# Patient Record
(demographics unavailable — no encounter records)

---

## 2017-01-01 NOTE — HP
Information from Mother's Record: 





 Previous Pregnancy/Births











Maternal Age                   35


 


Grav                           4


 


Para                           2


 


SAB                            1


 


IEA                            0


 


LC                             2


 


Maternal Blood Type and Rh     A Positive








 Testing Needs/Results











Gestational Age in Weeks and   40 Weeks and 1 Days





Days                           


 


Determined By                  LMP


 


Violence or Abuse During this  No





Pregnancy                      


 


Feeding Plan                   Breast,Formula


 


Planned Infant Care Provider   [facility]





Post-Discharge                 


 


Serology/RPR Result            Non-Reactive


 


Rubella Result                 Immune


 


HBsAg Result                   Negative


 


HIV Result                     Negative


 


GBS Culture Result             Positive











 Significant Medical History











Hx  Section            No








 Tobacco/Alcohol/Substance Use











Smoking Status (MU)            Never Smoked Tobacco


 


Alcohol Use                    None


 


Substance Use Type             None,Prescribed








 Delivery Information/Events of Note











Date of Birth [A]              04/10/17


 


Time of Birth [A]              10:57


 


Delivery Method [A]            Spontaneous Vaginal


 


Labor [A]                      Spontaneous


 


Did Patient attempt ? [A]  N/A, No Previous C-Sectio


 


Amniotic Fluid [A]             Clear


 


Anesthesia/Analgesia [A]       CEI for Labor


 


Level of Nursery               Regular/Bedside


 


Delivery Events of Note        Pitocin During Labor

















Delivery Events


Date of Birth: 04/10/17


Time of Birth: 10:57


Apgar Score 1 Minute: 9


Apgar Score 5 Minutes: 9


Gestational Age Weeks: 40


Gestational Age Days: 1


Delivery Type: Vaginal


Amniotic Fluid: Clear


Intrapartal Antibiotics Indicated: Urine GBS Positive


Antibiotic Treatment: Optimal Antibx given, >4hrs


Any S/S Sepsis Present in [place]: No


ROM Greater Than or Equal To 18 Hours: No


Chorioamnionitis or Fever of 100.4 or >: No


Hepatitis B Vaccine: Given Within 12 Hours


Immunoglobulin Given: No


 Drug Withdrawal Risk: None Apply


Hepatitis B Status/Risk: Mother HBsAg NEGATIVE With No New Risk Factors


Maternal Consent: Mother CONSENTS To Infant Hepatitis Vaccine +/- HBIG





Hypoglycemia Assessment


Hypoglycemia Risk - High: None


Hypoglycemia - Other Risk Factors: None


Hypoglycemia Symptoms: None


Chemstrip Protocol: N/A





Nutrition and Output





- Nutrition


Method of Feeding: Breast feeding


Feeding Frequency: Ad Faby





- Stool


Stool Passed: Yes





- Voiding


Voiding: Yes





Measurements


Current Weight: 7 lb 4.122 oz


Weight in lbs and ozs: 7 lbs and 4 oz


Weight Yesterday: 7 lb 9.025 oz


Weight Gain/Loss Since Last Weight In Grams: 139.0 Loss


Birth Weight: 7 lb 9.025 oz


Birthweight in lbs and ozs: 7 lbs and 9 oz


% Weight Gain/Loss from Birth Weight: 4% Loss


Length: 19.5 in


Head Circumference in inches: 14.2





Vitals


Vital Signs: 


 Vital Signs











  04/10/17 04/10/17 04/10/17





  11:36 11:52 12:00


 


Temperature 98.0 F 98.4 F 97.4 F


 


Pulse Rate 145 128 155


 


Respiratory 52 34 65





Rate   














  04/10/17 04/10/17 04/10/17





  13:05 14:28 16:37


 


Temperature 98.0 F 97.5 F 98.4 F


 


Pulse Rate 132 130 118


 


Respiratory 40 45 50





Rate   














  04/10/17 04/11/17 04/11/17





  20:13 00:43 05:13


 


Temperature 98.0 F 98.1 F 98 F


 


Pulse Rate 140 108 128


 


Respiratory 48 44 36





Rate   














 Physical Exam


General Appearance: Alert, Active


Skin Color: Normal


Level of Distress: No Distress


Nutritional Status: AGA


Cranial Features: Normal head shape, Symmetric facial features, Normal 

fontanelles


Eyes: Bilateral Normal, Bilateral Red Reflex


Ears: Symmetrical, Normal Position, Canals Patent


Oropharynx: Normal: Lips, Mouth, Gums, Uvula


Neck: Normal Tone


Respiratory Effort: Normal


Respiratory Rate: Normal


Chest Appearance: Normal, [place] Breast 3-4 mm Size, Symmetrical


Auscultation: Bilateral Good Air Exchange


Breath Sounds: NL Both Lungs


Location of Apical Pulse: Normal


Rhythm: Regular


Heart Sounds: Normal: S1, S2


Abnormal Heart Sounds: No Murmurs, No S3, No S4


Brachial Pulses: Bilateral Normal


Femoral Pulses: Bilateral Normal


Umbilicus Assessment: Yes Normal


Abdomen: Normal


Abdomen Palpation: Liver Normal, Spleen Normal


Hernia: None


Anus: Patent


Location of Anus: Normal


Genital Appearance: Male


Enlarged Nodes: None


Penis: Normal


Meatal Location: Tip of Glans


Scrotal Skin: Rugae Normal for GA


Scrotal Mass: Bilateral None


Testes: Bilateral Normal


Clavicles: Normal


Arms: 2 Symmetrical Extremities, Full Range of Motion


Hands: 2 Hands, Symmetrical, 5 Fingers on Each Hand, Full Range of Motion


Left Hip: Normal ROM


Right Hip: Normal ROM


Legs: 2 Symmetrical Extremities, Full Range of Motion


Feet: 2 Feet, Symmetrical, Creases on 2/3 of Soles, Full Range of Motion


Spine: Normal


Skin Texture: Smooth, Soft


Skin Appearance: No Abnormalities


Neuro: Normal: [place], Sucking, Muscle Tone


Cranial Nerve Exam: Cranial N. II-XII Normal


Deep Tendon Reflexes: Normal: Bicep, Knee, Ankle





Medications


Home Medications: 


 Home Medications











 Medication  Instructions  Recorded  Confirmed  Type


 


NK [No Home Medications Reported]  17 History











Inpatient Medications: 


 Medications





Dextrose (Glutose Oral Nicu*)  0 ml BUCCAL .SEE MD INSTRUCTIONS PRN; Protocol


   PRN Reason: ASYMTOMATIC HYPOGLYCEMIA











Results/Investigations


Lab Results: 


 











  04/10/17





  11:00


 


RPR  Nonreactive














Assessment





- Status


Status: Full-term, AGA


Assessment: 





Term AGA


Mom Gp B Strep positive, treated with 2 doses of PCN


Normal Exam





Plan of Care


 Admission to: [place] [facility]


Plan of Care: 





Normal care


Provided Guidance to: Mother

## 2017-01-01 NOTE — DS
Prenatal Information: 





 Previous Pregnancy/Births











Maternal Age                   35


 


Grav                           4


 


Para                           2


 


SAB                            1


 


IEA                            0


 


LC                             2


 


Maternal Blood Type and Rh     A Positive








 Testing Needs/Results











Gestational Age in Weeks and   40 Weeks and 1 Days





Days                           


 


Determined By                  LMP


 


Violence or Abuse During this  No





Pregnancy                      


 


Feeding Plan                   Breast,Formula


 


Planned Infant Care Provider   [facility]





Post-Discharge                 


 


Serology/RPR Result            Non-Reactive


 


Rubella Result                 Immune


 


HBsAg Result                   Negative


 


HIV Result                     Negative


 


GBS Culture Result             Positive











 Significant Medical History











Hx  Section            No








 Tobacco/Alcohol/Substance Use











Smoking Status (MU)            Never Smoked Tobacco


 


Alcohol Use                    None


 


Substance Use Type             None,Prescribed








 Delivery Information/Events of Note











Date of Birth [A]              04/10/17


 


Time of Birth [A]              10:57


 


Delivery Method [A]            Spontaneous Vaginal


 


Labor [A]                      Spontaneous


 


Did Patient attempt ? [A]  N/A, No Previous C-Sectio


 


Amniotic Fluid [A]             Clear


 


Anesthesia/Analgesia [A]       CEI for Labor


 


Level of Nursery               Regular/Bedside


 


Delivery Events of Note        Pitocin During Labor

















Delivery Events


Date of Birth: 04/10/17


Time of Birth: 10:57


Apgar Score 1 Minute: 9


Apgar Score 5 Minutes: 9


Gestational Age Weeks: 40


Gestational Age Days: 1


Delivery Type: Vaginal


Amniotic Fluid: Clear


Intrapartal Antibiotics Indicated: Urine GBS Positive


Antibiotic Treatment: Optimal Antibx given, >4hrs


Any S/S Sepsis Present in : No


ROM Greater Than or Equal To 18 Hours: No


Chorioamnionitis or Fever of 100.4 or >: No


Hepatitis B Vaccine: Given Within 12 Hours


Immunoglobulin Given: No


 Drug Withdrawal Risk: None Apply


Hepatitis B Status/Risk: Mother HBsAg NEGATIVE With No New Risk Factors


Maternal Consent: Mother CONSENTS To Infant Hepatitis Vaccine +/- HBIG


Interval History: 


 Intake and Output











 17





 04:59 05:59 06:59 07:59


 


Intake:    


 


  Formula Given Amount (mls 60   





  )    


 


    Similac 20 w/Iron 60   





No problems reported


Method of Feeding: Breast feeding


Formula: Similac Advance


Feeding Frequency: Every 2-3 Hours


Stool Passed: Yes


Voiding: Yes





Measurements


Current Weight: 3.272 kg


Weight in lbs and ozs: 7 lbs and 3 oz


Weight Yesterday: 3.292 kg


Weight Gain/Loss Since Last Weight In Grams: 20.0 Loss


Birth Weight: 3.431 kg


Birthweight in lbs and ozs: 7 lbs and 9 oz


% Weight Gain/Loss from Birth Weight: 5% Loss


Length: 19.5 in


Head Circumference in inches: 14.2





Vitals


Vital Signs: 


 Vital Signs











  17





  08:00 11:36 15:54


 


Temperature 98.8 F 98.0 F 98 F


 


Pulse Rate 110 104 124


 


Respiratory 45 50 38





Rate   














  17





  19:43 00:16 04:31


 


Temperature 98.7 F 98.3 F 98.8 F


 


Pulse Rate 130 120 146


 


Respiratory 36 40 42





Rate   














 Physical Exam


General Appearance: Alert, Active


Skin Color: Normal


Level of Distress: No Distress


Eyes: Bilateral Normal


Neck: Normal Tone


Respiratory Effort: Normal


Respiratory Rate: Normal


Auscultation: Bilateral Good Air Exchange


Breath Sounds: NL Both Lungs


Rhythm: Regular


Heart Sounds: Normal: S1, S2


Abnormal Heart Sounds: No Murmurs, No S3, No S4


Brachial Pulses: Bilateral Normal


Femoral Pulses: Bilateral Normal


Umbilicus Assessment: Yes Normal


Abdomen: Normal


Abdomen Palpation: Liver Normal, Spleen Normal


Genital Appearance: Male


Penis: Circumcision Healing Well


Clavicles: Normal


Left Hip: Normal ROM


Right Hip: Normal ROM


Skin Texture: Smooth, Soft


Skin Appearance: No Abnormalities


Neuro: Normal: [place], Sucking, Muscle Tone


Cranial Nerve Exam: Cranial N. II-XII Normal





Medications


Home Medications: 


 Home Medications











 Medication  Instructions  Recorded  Confirmed  Type


 


NK [No Home Medications Reported]  17 History











Inpatient Medications: 


 Medications





Dextrose (Glutose Oral Nicu*)  0 ml BUCCAL .SEE MD INSTRUCTIONS PRN; Protocol


   PRN Reason: ASYMTOMATIC HYPOGLYCEMIA











Results/Investigations


Transcutaneous Bilirubin Result: 2.1


Time Obtained: 00:10


Age in Hours: 37


Risk Zone: Low Risk


Major Jaundice Risk Factors: None


Minor Jaundice Risk Factors: Breastfeeding, Male, Mother > 24 yrs old


Decreased Jaundice Risk: Bili in low risk zone


CCHD Screen: Passed


Lab Results: 


 











  04/10/17





  11:00


 


RPR  Nonreactive














Hospital Course


Hospital Course: 





Unremarkable


Hearing Screen: Pending/In Process


Hepatitis B Vaccine: Given Within 12 Hours


Date Given: 04/10/17


[place] Done





Assessment





- Assessment


Condition at Discharge: Stable


Discharge Disposition: Home


Diagnosis at Discharge: Term male , AGA





Plan





- Follow Up Care


Follow Up Care Provider: [facility]


Follow up date: 17


Appointment Status: To Call Office





- Anticipatory Guidance/Instruction


Provided Guidance to: Mother